# Patient Record
Sex: MALE | Race: BLACK OR AFRICAN AMERICAN | NOT HISPANIC OR LATINO
[De-identification: names, ages, dates, MRNs, and addresses within clinical notes are randomized per-mention and may not be internally consistent; named-entity substitution may affect disease eponyms.]

---

## 2020-09-14 PROBLEM — Z00.00 ENCOUNTER FOR PREVENTIVE HEALTH EXAMINATION: Status: ACTIVE | Noted: 2020-09-14

## 2020-09-16 ENCOUNTER — APPOINTMENT (OUTPATIENT)
Dept: DERMATOLOGY | Facility: CLINIC | Age: 25
End: 2020-09-16
Payer: COMMERCIAL

## 2020-09-16 VITALS — HEIGHT: 73 IN | WEIGHT: 230 LBS | BODY MASS INDEX: 30.48 KG/M2

## 2020-09-16 VITALS — TEMPERATURE: 98.6 F

## 2020-09-16 DIAGNOSIS — Z87.2 PERSONAL HISTORY OF DISEASES OF THE SKIN AND SUBCUTANEOUS TISSUE: ICD-10-CM

## 2020-09-16 DIAGNOSIS — Z78.9 OTHER SPECIFIED HEALTH STATUS: ICD-10-CM

## 2020-09-16 DIAGNOSIS — Z84.0 FAMILY HISTORY OF DISEASES OF THE SKIN AND SUBCUTANEOUS TISSUE: ICD-10-CM

## 2020-09-16 LAB
BASOPHILS # BLD AUTO: 0.03 K/UL
BASOPHILS NFR BLD AUTO: 0.5 %
EOSINOPHIL # BLD AUTO: 0.07 K/UL
EOSINOPHIL NFR BLD AUTO: 1.1 %
HCT VFR BLD CALC: 48.1 %
HGB BLD-MCNC: 14.5 G/DL
IMM GRANULOCYTES NFR BLD AUTO: 0.2 %
LYMPHOCYTES # BLD AUTO: 1.82 K/UL
LYMPHOCYTES NFR BLD AUTO: 27.4 %
MAN DIFF?: NORMAL
MCHC RBC-ENTMCNC: 25 PG
MCHC RBC-ENTMCNC: 30.1 GM/DL
MCV RBC AUTO: 82.9 FL
MONOCYTES # BLD AUTO: 0.53 K/UL
MONOCYTES NFR BLD AUTO: 8 %
NEUTROPHILS # BLD AUTO: 4.18 K/UL
NEUTROPHILS NFR BLD AUTO: 62.8 %
PLATELET # BLD AUTO: 178 K/UL
RBC # BLD: 5.8 M/UL
RBC # FLD: 13.8 %
WBC # FLD AUTO: 6.64 K/UL

## 2020-09-16 PROCEDURE — 11900 INJECT SKIN LESIONS </W 7: CPT

## 2020-09-16 PROCEDURE — 99204 OFFICE O/P NEW MOD 45 MIN: CPT | Mod: 25

## 2020-09-17 LAB
ALBUMIN SERPL ELPH-MCNC: 4.6 G/DL
ALP BLD-CCNC: 98 U/L
ALT SERPL-CCNC: 22 U/L
ANION GAP SERPL CALC-SCNC: 12 MMOL/L
AST SERPL-CCNC: 19 U/L
BILIRUB SERPL-MCNC: 0.6 MG/DL
BUN SERPL-MCNC: 12 MG/DL
CALCIUM SERPL-MCNC: 9.3 MG/DL
CHLORIDE SERPL-SCNC: 99 MMOL/L
CHOLEST SERPL-MCNC: 114 MG/DL
CHOLEST/HDLC SERPL: 2.7 RATIO
CO2 SERPL-SCNC: 27 MMOL/L
CREAT SERPL-MCNC: 0.94 MG/DL
GLUCOSE SERPL-MCNC: 90 MG/DL
HBV CORE IGG+IGM SER QL: NONREACTIVE
HBV SURFACE AB SER QL: ABNORMAL
HCV AB SER QL: NONREACTIVE
HCV S/CO RATIO: 0.17 S/CO
HDLC SERPL-MCNC: 43 MG/DL
LDLC SERPL CALC-MCNC: 63 MG/DL
M TB IFN-G BLD-IMP: NEGATIVE
POTASSIUM SERPL-SCNC: 4.2 MMOL/L
PROT SERPL-MCNC: 8.6 G/DL
QUANTIFERON TB PLUS MITOGEN MINUS NIL: 7.2 IU/ML
QUANTIFERON TB PLUS NIL: 0.01 IU/ML
QUANTIFERON TB PLUS TB1 MINUS NIL: 0 IU/ML
QUANTIFERON TB PLUS TB2 MINUS NIL: 0 IU/ML
SODIUM SERPL-SCNC: 138 MMOL/L
TRIGL SERPL-MCNC: 39 MG/DL

## 2020-09-30 ENCOUNTER — TRANSCRIPTION ENCOUNTER (OUTPATIENT)
Age: 25
End: 2020-09-30

## 2020-10-07 ENCOUNTER — APPOINTMENT (OUTPATIENT)
Dept: DERMATOLOGY | Facility: CLINIC | Age: 25
End: 2020-10-07
Payer: COMMERCIAL

## 2020-10-07 VITALS — TEMPERATURE: 97.1 F

## 2020-10-07 VITALS — WEIGHT: 130 LBS | BODY MASS INDEX: 16.68 KG/M2 | HEIGHT: 74 IN

## 2020-10-07 PROCEDURE — 99214 OFFICE O/P EST MOD 30 MIN: CPT | Mod: 25,GC

## 2020-10-07 PROCEDURE — 11901 INJECT SKIN LESIONS >7: CPT | Mod: GC

## 2020-10-08 RX ORDER — ADALIMUMAB 40MG/0.8ML
40 KIT SUBCUTANEOUS
Qty: 2 | Refills: 2 | Status: DISCONTINUED | COMMUNITY
Start: 2020-10-07 | End: 2020-10-08

## 2020-10-15 ENCOUNTER — APPOINTMENT (OUTPATIENT)
Dept: DERMATOLOGY | Facility: CLINIC | Age: 25
End: 2020-10-15
Payer: COMMERCIAL

## 2020-10-15 VITALS — TEMPERATURE: 97.7 F

## 2020-10-15 PROCEDURE — 99213 OFFICE O/P EST LOW 20 MIN: CPT

## 2020-12-04 ENCOUNTER — TRANSCRIPTION ENCOUNTER (OUTPATIENT)
Age: 25
End: 2020-12-04

## 2021-01-20 ENCOUNTER — NON-APPOINTMENT (OUTPATIENT)
Age: 26
End: 2021-01-20

## 2021-01-21 ENCOUNTER — APPOINTMENT (OUTPATIENT)
Dept: DERMATOLOGY | Facility: CLINIC | Age: 26
End: 2021-01-21
Payer: COMMERCIAL

## 2021-01-21 PROCEDURE — 11900 INJECT SKIN LESIONS </W 7: CPT | Mod: 59

## 2021-01-21 PROCEDURE — 99215 OFFICE O/P EST HI 40 MIN: CPT | Mod: 25

## 2021-01-21 PROCEDURE — 10160 PNXR ASPIR ABSC HMTMA BULLA: CPT | Mod: 59

## 2021-01-21 PROCEDURE — 99072 ADDL SUPL MATRL&STAF TM PHE: CPT

## 2021-03-10 ENCOUNTER — APPOINTMENT (OUTPATIENT)
Dept: DERMATOLOGY | Facility: CLINIC | Age: 26
End: 2021-03-10
Payer: COMMERCIAL

## 2021-03-10 PROCEDURE — 11900 INJECT SKIN LESIONS </W 7: CPT

## 2021-03-10 PROCEDURE — 99215 OFFICE O/P EST HI 40 MIN: CPT | Mod: 25

## 2021-03-10 PROCEDURE — 99072 ADDL SUPL MATRL&STAF TM PHE: CPT

## 2021-03-11 LAB
ALBUMIN SERPL ELPH-MCNC: 4.2 G/DL
ALP BLD-CCNC: 87 U/L
ALT SERPL-CCNC: 13 U/L
ANION GAP SERPL CALC-SCNC: 11 MMOL/L
AST SERPL-CCNC: 12 U/L
BASOPHILS # BLD AUTO: 0.04 K/UL
BASOPHILS NFR BLD AUTO: 0.6 %
BILIRUB SERPL-MCNC: 0.4 MG/DL
BUN SERPL-MCNC: 8 MG/DL
CALCIUM SERPL-MCNC: 9.6 MG/DL
CHLORIDE SERPL-SCNC: 99 MMOL/L
CHOLEST SERPL-MCNC: 106 MG/DL
CO2 SERPL-SCNC: 27 MMOL/L
CREAT SERPL-MCNC: 1.01 MG/DL
EOSINOPHIL # BLD AUTO: 0.04 K/UL
EOSINOPHIL NFR BLD AUTO: 0.6 %
GLUCOSE SERPL-MCNC: 86 MG/DL
HCT VFR BLD CALC: 45.7 %
HDLC SERPL-MCNC: 37 MG/DL
HGB BLD-MCNC: 13.8 G/DL
IMM GRANULOCYTES NFR BLD AUTO: 0.3 %
LDLC SERPL CALC-MCNC: 60 MG/DL
LYMPHOCYTES # BLD AUTO: 2.11 K/UL
LYMPHOCYTES NFR BLD AUTO: 33.2 %
MAN DIFF?: NORMAL
MCHC RBC-ENTMCNC: 25 PG
MCHC RBC-ENTMCNC: 30.2 GM/DL
MCV RBC AUTO: 82.9 FL
MONOCYTES # BLD AUTO: 0.89 K/UL
MONOCYTES NFR BLD AUTO: 14 %
NEUTROPHILS # BLD AUTO: 3.25 K/UL
NEUTROPHILS NFR BLD AUTO: 51.3 %
NONHDLC SERPL-MCNC: 69 MG/DL
PLATELET # BLD AUTO: 286 K/UL
POTASSIUM SERPL-SCNC: 4.8 MMOL/L
PROT SERPL-MCNC: 9 G/DL
RBC # BLD: 5.51 M/UL
RBC # FLD: 13.2 %
SODIUM SERPL-SCNC: 137 MMOL/L
TRIGL SERPL-MCNC: 44 MG/DL
WBC # FLD AUTO: 6.35 K/UL

## 2021-03-12 ENCOUNTER — NON-APPOINTMENT (OUTPATIENT)
Age: 26
End: 2021-03-12

## 2021-04-14 ENCOUNTER — APPOINTMENT (OUTPATIENT)
Dept: DERMATOLOGY | Facility: CLINIC | Age: 26
End: 2021-04-14
Payer: COMMERCIAL

## 2021-04-14 DIAGNOSIS — L30.9 DERMATITIS, UNSPECIFIED: ICD-10-CM

## 2021-04-14 DIAGNOSIS — L72.3 SEBACEOUS CYST: ICD-10-CM

## 2021-04-14 PROCEDURE — 11900 INJECT SKIN LESIONS </W 7: CPT | Mod: 59

## 2021-04-14 PROCEDURE — 99072 ADDL SUPL MATRL&STAF TM PHE: CPT

## 2021-04-14 PROCEDURE — 99214 OFFICE O/P EST MOD 30 MIN: CPT | Mod: 25

## 2021-04-14 PROCEDURE — 10160 PNXR ASPIR ABSC HMTMA BULLA: CPT | Mod: 59

## 2021-04-14 RX ORDER — PREDNISONE 20 MG/1
20 TABLET ORAL
Qty: 42 | Refills: 0 | Status: ACTIVE | COMMUNITY
Start: 2021-04-14 | End: 1900-01-01

## 2021-04-14 NOTE — PHYSICAL EXAM
[Alert] : alert [Oriented x 3] : ~L oriented x 3 [Well Nourished] : well nourished [Conjunctiva Non-injected] : conjunctiva non-injected [No Visual Lymphadenopathy] : no visual  lymphadenopathy [No Clubbing] : no clubbing [No Edema] : no edema [No Bromhidrosis] : no bromhidrosis [No Chromhidrosis] : no chromhidrosis [FreeTextEntry3] : the scalp with boggy plaques and scarring with sinus tracts \par \par the occipital scalp with inflamed nodules \par \par the back and face with hyperpigmented nodules pustules and inflammatory papules \par \par the axillae with scarring, sinus tracts and double headed comedones, two draining sinuses on right axilla

## 2021-04-14 NOTE — HISTORY OF PRESENT ILLNESS
[FreeTextEntry1] : f/u HS  [de-identified] : 26 yo M here for f/u: \par \par 1) HS, flaring, pain in right axilla,  tolerated ILK at last visit with initial mild improvement after injections but then painful flare, now on Humira since Oct 2020 with no improvement. \par was switched to cosentyx last visit and recently just started taking.\par \par 2) Cystic acne/PFB/AKN - flaring on scale, itchy, painful. \par \par No fevers or chills. No bone pain. No joint or muscle pain. \par

## 2021-04-14 NOTE — ASSESSMENT
[FreeTextEntry1] : #HS in the axillae and groin, flaring,\par -Counseled on the nature and course and tx options \par -Given NO improvement s/p humira initiation 10/2020, favor treating with cosentyx (IL17 inhibitor) \par -c/w cosenyx; SED\par \par discussed need for accutane but planning on pregnancy soon; understands accutane may be required and will continue to consider\par \par Intralesional injection of Kenalog, 5 mg/ml \par A total of 0.6 cc was injected to jaw\par The risks/benefits/alternatives of intralesional steroid injections were reviewed with the patient which include but are not limited to pain, atrophy, and dispigmentation. Intralesional injections were performed in the affected area. The patient tolerated the procedure well. \par - c/w cosentyx 300mg; SED\par \par aspiration I&D drained 1 cc from cysts; SED\par \par #AKN of scalp , flaring\par - discussed need for accutane; will continue to consider\par -given severe flare; prednisone taper\par Prednisone taper at 60 mg x 1 week, 40 x 1 week, 20 x1 week; SED\par \par #Acne Vulgaris, flaring\par - see above \par \par #High risk medication use- plan to transition from humira to cosentyx\par -3/2021 CBC CMP, lipids \par \par RTC in 2-3 months

## 2021-05-19 ENCOUNTER — APPOINTMENT (OUTPATIENT)
Dept: DERMATOLOGY | Facility: CLINIC | Age: 26
End: 2021-05-19
Payer: COMMERCIAL

## 2021-05-19 PROCEDURE — 99214 OFFICE O/P EST MOD 30 MIN: CPT

## 2021-05-19 PROCEDURE — 99072 ADDL SUPL MATRL&STAF TM PHE: CPT

## 2021-05-19 RX ORDER — BENZOYL PEROXIDE 10 %
10 GEL (GRAM) TOPICAL
Qty: 1 | Refills: 3 | Status: ACTIVE | COMMUNITY
Start: 2021-05-19 | End: 1900-01-01

## 2021-05-19 RX ORDER — CLINDAMYCIN PHOSPHATE 10 MG/ML
1 LOTION TOPICAL
Qty: 1 | Refills: 3 | Status: ACTIVE | COMMUNITY
Start: 2021-05-19 | End: 1900-01-01

## 2021-05-25 ENCOUNTER — NON-APPOINTMENT (OUTPATIENT)
Age: 26
End: 2021-05-25

## 2021-06-10 ENCOUNTER — APPOINTMENT (OUTPATIENT)
Dept: DERMATOLOGY | Facility: CLINIC | Age: 26
End: 2021-06-10
Payer: COMMERCIAL

## 2021-06-10 PROCEDURE — 99214 OFFICE O/P EST MOD 30 MIN: CPT

## 2021-06-10 PROCEDURE — 99072 ADDL SUPL MATRL&STAF TM PHE: CPT

## 2021-06-10 RX ORDER — SULFAMETHOXAZOLE AND TRIMETHOPRIM 800; 160 MG/1; MG/1
800-160 TABLET ORAL TWICE DAILY
Qty: 60 | Refills: 1 | Status: ACTIVE | COMMUNITY
Start: 2021-06-10 | End: 1900-01-01

## 2021-07-12 ENCOUNTER — APPOINTMENT (OUTPATIENT)
Dept: DERMATOLOGY | Facility: CLINIC | Age: 26
End: 2021-07-12
Payer: COMMERCIAL

## 2021-07-12 DIAGNOSIS — L73.0 ACNE KELOID: ICD-10-CM

## 2021-07-12 DIAGNOSIS — L73.1 PSEUDOFOLLICULITIS BARBAE: ICD-10-CM

## 2021-07-12 PROCEDURE — 99072 ADDL SUPL MATRL&STAF TM PHE: CPT

## 2021-07-12 PROCEDURE — 99214 OFFICE O/P EST MOD 30 MIN: CPT

## 2021-07-15 ENCOUNTER — APPOINTMENT (OUTPATIENT)
Dept: DERMATOLOGY | Facility: CLINIC | Age: 26
End: 2021-07-15
Payer: COMMERCIAL

## 2021-07-15 DIAGNOSIS — L70.0 ACNE VULGARIS: ICD-10-CM

## 2021-07-15 DIAGNOSIS — L66.3 PERIFOLLICULITIS CAPITIS ABSCEDENS: ICD-10-CM

## 2021-07-15 DIAGNOSIS — Z79.899 OTHER LONG TERM (CURRENT) DRUG THERAPY: ICD-10-CM

## 2021-07-15 PROCEDURE — 99215 OFFICE O/P EST HI 40 MIN: CPT

## 2021-07-15 PROCEDURE — 99072 ADDL SUPL MATRL&STAF TM PHE: CPT

## 2021-08-12 ENCOUNTER — RX RENEWAL (OUTPATIENT)
Age: 26
End: 2021-08-12

## 2021-08-15 ENCOUNTER — RX RENEWAL (OUTPATIENT)
Age: 26
End: 2021-08-15

## 2021-08-24 ENCOUNTER — NON-APPOINTMENT (OUTPATIENT)
Age: 26
End: 2021-08-24

## 2021-09-01 ENCOUNTER — NON-APPOINTMENT (OUTPATIENT)
Age: 26
End: 2021-09-01

## 2021-09-09 ENCOUNTER — NON-APPOINTMENT (OUTPATIENT)
Age: 26
End: 2021-09-09

## 2021-10-14 ENCOUNTER — APPOINTMENT (OUTPATIENT)
Dept: DERMATOLOGY | Facility: CLINIC | Age: 26
End: 2021-10-14

## 2021-10-18 ENCOUNTER — NON-APPOINTMENT (OUTPATIENT)
Age: 26
End: 2021-10-18

## 2021-10-19 ENCOUNTER — APPOINTMENT (OUTPATIENT)
Dept: DERMATOLOGY | Facility: CLINIC | Age: 26
End: 2021-10-19
Payer: COMMERCIAL

## 2021-10-19 VITALS — WEIGHT: 130 LBS | HEIGHT: 74 IN | BODY MASS INDEX: 16.68 KG/M2

## 2021-10-19 DIAGNOSIS — Z79.899 OTHER LONG TERM (CURRENT) DRUG THERAPY: ICD-10-CM

## 2021-10-19 DIAGNOSIS — L73.2 HIDRADENITIS SUPPURATIVA: ICD-10-CM

## 2021-10-19 PROCEDURE — 99214 OFFICE O/P EST MOD 30 MIN: CPT

## 2021-10-19 RX ORDER — DOXYCYCLINE HYCLATE 100 MG/1
100 CAPSULE ORAL
Qty: 60 | Refills: 3 | Status: DISCONTINUED | COMMUNITY
Start: 2021-01-21 | End: 2021-10-19

## 2021-10-19 RX ORDER — CETIRIZINE HYDROCHLORIDE 10 MG/1
10 TABLET, COATED ORAL
Qty: 1 | Refills: 0 | Status: DISCONTINUED | OUTPATIENT
Start: 2021-07-15 | End: 2021-10-19

## 2021-10-19 RX ORDER — SECUKINUMAB 150 MG/ML
150 INJECTION SUBCUTANEOUS
Qty: 5 | Refills: 0 | Status: DISCONTINUED | OUTPATIENT
Start: 2021-03-10 | End: 2021-10-19

## 2021-10-19 RX ORDER — ADALIMUMAB 40MG/0.4ML
40 KIT SUBCUTANEOUS
Qty: 1 | Refills: 6 | Status: ACTIVE | COMMUNITY
Start: 2020-10-08 | End: 1900-01-01

## 2021-10-19 RX ORDER — ACETAMINOPHEN 325 MG/1
325 TABLET ORAL
Qty: 2 | Refills: 6 | Status: DISCONTINUED | OUTPATIENT
Start: 2021-07-15 | End: 2021-10-19

## 2021-10-19 RX ORDER — ETANERCEPT 50 MG/ML
50 SOLUTION SUBCUTANEOUS
Qty: 1 | Refills: 1 | Status: DISCONTINUED | COMMUNITY
Start: 2021-06-10 | End: 2021-10-19

## 2021-10-19 RX ORDER — MINOCYCLINE HYDROCHLORIDE 75 MG/1
CAPSULE ORAL
Refills: 0 | Status: DISCONTINUED | COMMUNITY
End: 2021-10-19

## 2021-10-19 RX ORDER — INFLIXIMAB 100 MG/10ML
100 INJECTION, POWDER, LYOPHILIZED, FOR SOLUTION INTRAVENOUS
Qty: 1 | Refills: 5 | Status: DISCONTINUED | OUTPATIENT
Start: 2021-07-15 | End: 2021-10-19

## 2021-10-19 RX ORDER — SECUKINUMAB 150 MG/ML
150 INJECTION SUBCUTANEOUS
Qty: 4 | Refills: 0 | Status: DISCONTINUED | OUTPATIENT
Start: 2021-03-10 | End: 2021-10-19

## 2021-10-19 RX ORDER — ADALIMUMAB 80MG/0.8ML
80 KIT SUBCUTANEOUS
Qty: 1 | Refills: 0 | Status: ACTIVE | COMMUNITY
Start: 2020-10-07 | End: 1900-01-01

## 2021-10-19 RX ORDER — ETANERCEPT 50 MG/ML
50 SOLUTION SUBCUTANEOUS
Qty: 2 | Refills: 2 | Status: DISCONTINUED | COMMUNITY
Start: 2021-06-10 | End: 2021-10-19

## 2021-12-14 ENCOUNTER — APPOINTMENT (OUTPATIENT)
Dept: DERMATOLOGY | Facility: CLINIC | Age: 26
End: 2021-12-14

## 2021-12-23 ENCOUNTER — RX RENEWAL (OUTPATIENT)
Age: 26
End: 2021-12-23

## 2021-12-23 RX ORDER — PREDNISONE 10 MG/1
10 TABLET ORAL
Qty: 42 | Refills: 0 | Status: ACTIVE | COMMUNITY
Start: 2021-07-15 | End: 1900-01-01

## 2023-05-05 ENCOUNTER — RX RENEWAL (OUTPATIENT)
Age: 28
End: 2023-05-05

## 2023-05-05 RX ORDER — KETOCONAZOLE 20.5 MG/ML
2 SHAMPOO, SUSPENSION TOPICAL
Qty: 120 | Refills: 6 | Status: ACTIVE | COMMUNITY
Start: 2021-09-02 | End: 1900-01-01

## 2024-06-08 ENCOUNTER — EMERGENCY (EMERGENCY)
Facility: HOSPITAL | Age: 29
LOS: 0 days | Discharge: HOME | End: 2024-06-08
Attending: STUDENT IN AN ORGANIZED HEALTH CARE EDUCATION/TRAINING PROGRAM
Payer: COMMERCIAL

## 2024-06-08 VITALS
HEIGHT: 74 IN | HEART RATE: 75 BPM | TEMPERATURE: 100 F | WEIGHT: 240.08 LBS | RESPIRATION RATE: 16 BRPM | SYSTOLIC BLOOD PRESSURE: 147 MMHG | DIASTOLIC BLOOD PRESSURE: 95 MMHG

## 2024-06-08 DIAGNOSIS — T82.838A HEMORRHAGE DUE TO VASCULAR PROSTHETIC DEVICES, IMPLANTS AND GRAFTS, INITIAL ENCOUNTER: ICD-10-CM

## 2024-06-08 PROCEDURE — 99283 EMERGENCY DEPT VISIT LOW MDM: CPT

## 2024-06-08 NOTE — ED PROVIDER NOTE - OBJECTIVE STATEMENT
29 y/o M w/ PMH of hydradenitis suppurativa (on daily ertapenem and monthly remacaide via RUE PICC) presenting w/ bleeding from PICC. Reports used PICC like usual today, but afterwards noticed bleeding around the skin insertion site. Was concerned and went to urgent care. Was referred to ED. Treatment being managed by PICC nurse. Last had dressing changed on Wednesday. PICC nurse sent message to his dermatologist, but they don't expect to hear back from them over the weekend. PICC nurse stated he can come see them today to change dressing if discharged home. Pt has not other complaints today and is feeling well otherwise. No AC use. Denies fevers, chills, headache, dizziness, blurred vision, chest pain, cough, shortness of breath, abdominal pain, n/v/d/c, urinary symptoms, MSK pain, rash.

## 2024-06-08 NOTE — ED ADULT NURSE NOTE - CHIEF COMPLAINT QUOTE
Bleeding from right arm picc line x 4 hours. Sent from urgent care for possible removal of line. hx hidradenitis suppurativa on continuous iv and oral antibiotic since February.

## 2024-06-08 NOTE — ED PROVIDER NOTE - PATIENT PORTAL LINK FT
You can access the FollowMyHealth Patient Portal offered by [facility] by registering at the following website: [email or web address]. By joining [employer]’s FollowMyHealth portal, you will also be able to view your health information using other applications (apps) compatible with our system.

## 2024-06-08 NOTE — ED PROVIDER NOTE - NSFOLLOWUPINSTRUCTIONS_ED_ALL_ED_FT
You were seen in the ED for bleeding from your PICC line.    Your PICC line shital back blood easily and flushed easily.    Please follow up with the PICC line nurse and your dermatologist.    Please continue your home medications like usual.    Please return to the ED for new or worsening symptoms.

## 2024-06-08 NOTE — ED PROVIDER NOTE - CLINICAL SUMMARY MEDICAL DECISION MAKING FREE TEXT BOX
27 y/o M w/ PMH of hydradenitis suppurativa (on daily ertapenem and monthly remacaide via RUE PICC) presenting w/ bleeding from PICC. Reports used PICC like usual today, but afterwards noticed bleeding around the skin insertion site. Was concerned and went to urgent care. Was referred to ED. Treatment being managed by PICC nurse. Last had dressing changed on Wednesday. PICC nurse sent message to his dermatologist, but they don't expect to hear back from them over the weekend. PICC nurse stated he can come see them today to change dressing if discharged home. Pt has not other complaints today and is feeling well otherwise. No AC use. Denies fevers, chills, headache, dizziness, blurred vision, chest pain, cough, shortness of breath, abdominal pain, n/v/d/c, urinary symptoms, MSK pain, rash. Pt overall well appearing, No active bleeding. PICC line draws back well and flushes easily. Pt is comfortable w/ DC and his PICC line nurse will come to his house to change dressing. He will follow up with the dermatologist on Monday. Strict return precautions provided. Ready for DC.

## 2024-06-08 NOTE — ED ADULT TRIAGE NOTE - CHIEF COMPLAINT QUOTE
Bleeding from right arm picc line x 4 hours. Sent from urgent care for possible removal of line. hx hidradenitis suppurativa on continuous iv and oral antibiotic since February.
2 seconds or less

## 2024-06-08 NOTE — ED ADULT NURSE NOTE - OBJECTIVE STATEMENT
pt stated his ANNA PICC line has been bleeding under the dressing. pt stated the PICC was placed in Feb for long term ABX and the dressing was changed on Wednesday and pt observed to the dressing today during ABX treatment.   JUNIOR Denney